# Patient Record
Sex: FEMALE | Race: WHITE | Employment: UNEMPLOYED | URBAN - METROPOLITAN AREA
[De-identification: names, ages, dates, MRNs, and addresses within clinical notes are randomized per-mention and may not be internally consistent; named-entity substitution may affect disease eponyms.]

---

## 2021-08-30 ENCOUNTER — OFFICE VISIT (OUTPATIENT)
Dept: ORTHOPEDIC SURGERY | Age: 51
End: 2021-08-30
Payer: COMMERCIAL

## 2021-08-30 VITALS
BODY MASS INDEX: 32.43 KG/M2 | HEART RATE: 96 BPM | HEIGHT: 63 IN | TEMPERATURE: 96.9 F | OXYGEN SATURATION: 97 % | WEIGHT: 183 LBS

## 2021-08-30 DIAGNOSIS — S83.241A ACUTE MEDIAL MENISCUS TEAR OF RIGHT KNEE, INITIAL ENCOUNTER: Primary | ICD-10-CM

## 2021-08-30 DIAGNOSIS — G57.61 MORTON'S NEUROMA OF RIGHT FOOT: ICD-10-CM

## 2021-08-30 DIAGNOSIS — M79.671 RIGHT FOOT PAIN: ICD-10-CM

## 2021-08-30 DIAGNOSIS — M25.561 RIGHT KNEE PAIN, UNSPECIFIED CHRONICITY: ICD-10-CM

## 2021-08-30 PROCEDURE — 73560 X-RAY EXAM OF KNEE 1 OR 2: CPT | Performed by: ORTHOPAEDIC SURGERY

## 2021-08-30 PROCEDURE — 73620 X-RAY EXAM OF FOOT: CPT | Performed by: ORTHOPAEDIC SURGERY

## 2021-08-30 PROCEDURE — 99203 OFFICE O/P NEW LOW 30 MIN: CPT | Performed by: ORTHOPAEDIC SURGERY

## 2021-08-30 RX ORDER — CELECOXIB 200 MG/1
200 CAPSULE ORAL 2 TIMES DAILY
Qty: 60 CAPSULE | Refills: 1 | Status: SHIPPED | OUTPATIENT
Start: 2021-08-30

## 2021-08-30 NOTE — PROGRESS NOTES
Levi Burnette  1970   Chief Complaint   Patient presents with    Foot Pain     right foot    Knee Pain     right knee        HISTORY OF PRESENT ILLNESS  Levi Burnette is a 46 y.o. female who presents today for evaluation of right kneeShe rates her pain 1/10 today. Pain has been present since July 2021. She was working in the yard and noted a pain later that day. She had to hop and use a cane for a couple days following the onset of pain. Notes that the pain does come and go but describes a sharp sensation accompanied with popping in the knee. Has back and side knee pain. Pt also c/o of right foot pain near the ball of her foot. This pain has been present for around a year. Foot started feeling achy and sore and has been present intermittently. Patient describes the pain as dull that is Intermittent in nature. Symptoms are worse with bending, stretching, straightening, and walking  and is better with  wearing shoes and, Rest. Associated symptoms include Swelling. Since problem started, it: is unchanged. Pain does not wake patient up at night. Has taken NSAIDs for the problem. Has tried following treatments: Injections:NO; Brace:NO;  Therapy:NO; Cane/Crutch:YES      No Known Allergies     Past Medical History:   Diagnosis Date    Anemia     GERD (gastroesophageal reflux disease)     Submucous leiomyoma of uterus       Social History     Socioeconomic History    Marital status:      Spouse name: Not on file    Number of children: Not on file    Years of education: Not on file    Highest education level: Not on file   Occupational History    Not on file   Tobacco Use    Smoking status: Never Smoker    Smokeless tobacco: Never Used   Substance and Sexual Activity    Alcohol use: Never    Drug use: Never    Sexual activity: Yes     Birth control/protection: None   Other Topics Concern    Not on file   Social History Narrative    Not on file     Social Determinants of Health     Financial Resource Strain:     Difficulty of Paying Living Expenses:    Food Insecurity:     Worried About Running Out of Food in the Last Year:     [address] in the Last Year:    Transportation Needs:     Lack of Transportation (Medical):  Lack of Transportation (Non-Medical):    Physical Activity:     Days of Exercise per Week:     Minutes of Exercise per Session:    Stress:     Feeling of Stress :    Social Connections:     Frequency of Communication with Friends and Family:     Frequency of Social Gatherings with Friends and Family:     Attends Judaism Services:     Active Member of Clubs or Organizations:     Attends Club or Organization Meetings:     Marital Status:    Intimate Partner Violence:     Fear of Current or Ex-Partner:     Emotionally Abused:     Physically Abused:     Sexually Abused:       Past Surgical History:   Procedure Laterality Date    HX  SECTION  1988    HX  SECTION      HX GYN      HX WISDOM TEETH EXTRACTION        Family History   Problem Relation Age of Onset    Cancer Mother     Diabetes Father       Current Outpatient Medications   Medication Sig    celecoxib (CeleBREX) 200 mg capsule Take 1 Capsule by mouth two (2) times a day.  biotin 10,000 mcg TbDi biotin 10,000 mcg disintegrating tablet   Take by oral route.  ascorbic acid, vitamin C, (VITAMIN C) 500 mg tablet Vitamin C 500 mg tablet   Take by oral route.  black cohosh 540 mg cap black cohosh 540 mg capsule   Take by oral route.  cholecalciferol (VITAMIN D3) 25 mcg (1,000 unit) cap Take  by mouth daily.  naproxen sodium (ALEVE) 220 mg cap Take  by mouth as needed.  ferrous sulfate 325 mg (65 mg iron) cpER Take 6 Tabs by mouth daily.  zinc 50 mg tab tablet Take  by mouth daily.  Cyanocobalamin-Cobamamide (B12) 5,000-100 mcg lozg by SubLINGual route two (2) times a day.  omeprazole (PRILOSEC OTC) 20 mg tablet Take 20 mg by mouth daily.     loratadine (CLARITIN) 10 mg tablet Take 10 mg by mouth daily. No current facility-administered medications for this visit. REVIEW OF SYSTEM   Patient denies: Weight loss, Fever/Chills, HA, Visual changes, Fatigue, Chest pain, SOB, Abdominal pain, N/V/D/C, Blood in stool or urine, Edema. Pertinent positive as above in HPI. All others were negative    PHYSICAL EXAM:   Visit Vitals  Pulse 96   Temp 96.9 °F (36.1 °C) (Temporal)   Ht 5' 3\" (1.6 m)   Wt 183 lb (83 kg)   SpO2 97%   BMI 32.42 kg/m²     The patient is a well-developed, well-nourished female   in no acute distress. The patient is alert and oriented times three. The patient is alert and oriented times three. Mood and affect are normal.  LYMPHATIC: lymph nodes are not enlarged and are within normal limits  SKIN: normal in color and non tender to palpation. There are no bruises or abrasions noted. NEUROLOGICAL: Motor sensory exam is within normal limits. Reflexes are equal bilaterally.  There is normal sensation to pinprick and light touch  MUSCULOSKELETAL:  Examination Right knee   Skin Intact   Range of motion 0-130   Effusion +   Medial joint line tenderness +   Lateral joint line tenderness -   Tenderness Pes Bursa -   Tenderness insertion MCL -   Tenderness insertion LCL -   Marks +   Patella crepitus +   Patella grind -   Lachman -   Pivot shift -   Anterior drawer -   Posterior drawer -   Varus stress -   Valgus stress -   Neurovascular Intact   Calf Swelling and Tenderness to Palpation -   Annalisa's Test -   Hamstring Cord Tightness -      Examination Right Ankle/Foot   Skin Intact   Swelling -   Dorsiflexion 10   Plantarflexion 25   Deformity -   Inversion laxity -   Anterior drawer -   Medial malleolus tenderness -   Lateral malleolus tenderness -   Heel cord Intact   Heel cord Tightness -   Swain's Test -   Annalisa's Test -   Sensation Intact   Bunion -   Capillary refill Normal       IMAGING: XR of the right knee with 2 views obtained in the office dated 8/30/3021 was reviewed and read by Dr. Elaine Ying: decreased joint space on the medial side    XR of the right foot with 2 views obtained in the office dated 8/30/2021 was reviewed and read by Dr. Elaine Ying: Moderate degenerative changes in the mid-foot      IMPRESSION:      ICD-10-CM ICD-9-CM    1. Acute medial meniscus tear of right knee, initial encounter  S83.241A 836.0 MRI KNEE RT WO CONT      celecoxib (CeleBREX) 200 mg capsule   2. Right knee pain, unspecified chronicity  M25.561 719.46 AMB POC XRAY, KNEE; 1/2 VIEWS   3. Right foot pain  M79.671 729.5 POC XRAY, FOOT; 2 VIEWS   4. Mejia's neuroma of right foot  G57.61 355.6         PLAN:  1. Patient presents today with right knee to foot pain and I would like to order a MRI to r/o a possible meniscus tear of the right knee. Prescribed Celebrex to help with the pain and inflammation. Her symptoms of her right foot is c/w mortan's neuroma. I would like her to follow up with Dr. Jamar Severino for the foot pain, and return in the office following the MRI. Risk factors include: n/a  2. No ultrasound exam indicated today  3. No cortisone injection indicated today   4. No Physical/Occupational Therapy indicated today  5. Yes diagnostic test indicated today: R KNEE MRI  6. No durable medical equipment indicated today  7. No referral indicated today   8. Yes medications indicated today: CELEBREX  9. No Narcotic indicated today    RTC Following MRI       Scribed by Eunice Myers [address]) as dictated by Jade Sheth MD    I, Dr. Jade Sheth, confirm that all documentation is accurate.     Jade Sheth M.D.   [facility]

## 2021-08-31 ENCOUNTER — TELEPHONE (OUTPATIENT)
Dept: ORTHOPEDIC SURGERY | Age: 51
End: 2021-08-31

## 2021-08-31 RX ORDER — MELOXICAM 15 MG/1
15 TABLET ORAL
Qty: 30 TABLET | Refills: 2 | Status: SHIPPED | OUTPATIENT
Start: 2021-08-31

## 2021-08-31 NOTE — TELEPHONE ENCOUNTER
PA is required for Celecoxib 200mg    Cover My Meds Key:  Ankur Charles (Key: [id number])    Donna Houston is processing your PA request and will respond shortly with next steps. You are currently using the fastest method to process this prior authorization. Please do not fax or call [facility] to resubmit this request. To check for an update later, open this request again from your dashboard.

## 2021-09-10 ENCOUNTER — TELEPHONE (OUTPATIENT)
Dept: ORTHOPEDIC SURGERY | Age: 51
End: 2021-09-10

## 2021-09-10 NOTE — TELEPHONE ENCOUNTER
Patient called stating she only has an MRI of her knee scheduled but she says she was told she would also be getting an MRI of her foot. She wants to confirm an MRI of the foot is also needed. She's requesting a call back regarding this at [phone].

## 2021-09-10 NOTE — TELEPHONE ENCOUNTER
Spoke with patient. Patient verbalized understanding that MRI of the knee has been ordered. Patient will see Dr. Vianey Woodard for foot pain, at that time Dr. Vianey Woodard will determine if MRI of the foot is necessary. Patient understood.

## 2021-09-10 NOTE — TELEPHONE ENCOUNTER
Per [facility] she needs to see Dr Flaquita Alexis for her foot who will determine if MRI is needed    She is to also follow up with [facility] following her MRI of her knee.  (apt will be week of 9/20)

## 2021-09-16 ENCOUNTER — HOSPITAL ENCOUNTER (OUTPATIENT)
Age: 51
Discharge: HOME | End: 2021-09-16
Attending: ORTHOPAEDIC SURGERY
Payer: COMMERCIAL

## 2021-09-16 DIAGNOSIS — S83.241A ACUTE MEDIAL MENISCUS TEAR OF RIGHT KNEE, INITIAL ENCOUNTER: ICD-10-CM

## 2021-09-16 PROCEDURE — 73721 MRI JNT OF LWR EXTRE W/O DYE: CPT

## 2021-09-30 ENCOUNTER — OFFICE VISIT (OUTPATIENT)
Dept: ORTHOPEDIC SURGERY | Age: 51
End: 2021-09-30
Payer: COMMERCIAL

## 2021-09-30 VITALS
HEIGHT: 63 IN | WEIGHT: 189 LBS | HEART RATE: 87 BPM | OXYGEN SATURATION: 97 % | BODY MASS INDEX: 33.49 KG/M2 | TEMPERATURE: 96.9 F

## 2021-09-30 DIAGNOSIS — S83.241A TEAR OF MEDIAL MENISCUS OF RIGHT KNEE, CURRENT, UNSPECIFIED TEAR TYPE, INITIAL ENCOUNTER: Primary | ICD-10-CM

## 2021-09-30 PROCEDURE — 99214 OFFICE O/P EST MOD 30 MIN: CPT | Performed by: ORTHOPAEDIC SURGERY

## 2021-10-04 NOTE — PROGRESS NOTES
AMBULATORY PROGRESS NOTE      Patient: Mu Santa             MRN: [id number]     SSN: [id number] Body mass index is 33.37 kg/m². YOB: 1970     AGE: 46 y.o. EX: female    PCP: Deedee Scott MD       IMPRESSION //  DIAGNOSIS AND TREATMENT PLAN        Mu Santa has a diagnosis of:        Explained her that she has metatarsalgia, and more than likely interdigital webspace, neuritis: The conditions for custom trilaminar,  with a metatarsal pad to offload and balance her forefoot. If her symptoms do not improve, order MRI of her right forefoot. DIAGNOSES    1. Metatarsalgia of right foot    2. Neuritis of right foot    3. Right foot pain        Orders Placed This Encounter    Generic Supply Order     R Custom Trilaminar Orthotic w/ goal to offload forefoot    Diagnosis: forefoot neuritic pain        PLAN:    1. DME: R Custom Trilaminar Orthotic: goal to offload forefoot. 2.  If her symptoms, do not improve, MRI of the right forefoot will be obtained, assess the individual species. RTO-5 weeks follow-up    Mu Santa  expresses understanding of the diagnosis, treatment plan, and all of their proposed questions were answered to their satisfaction. Patient education has been provided re the diagnoses. HPI // 67 [address] Sissy Bland IS A 46 y.o. female who is a/an  new patient, presenting to my outpatient office for evaluation of  the following chief complaint(s):     Chief Complaint   Patient presents with    Foot Pain     right, when bare foot has pain in the balls of feet and numbness in toes, feels like something is in foot       Patient presents today w/: She is mentioned having pain discomfort, to the right foot, since July 2020. Her right forefoot pain, started after states she was doing some yard work.   She reports having pain, feels like he is walking on a marble, and occasionally she has numbness tingling and burning in her second webspace is where she points to. At times it feels like she is walking with wet socks, when she has the onset of her neuritic pain in the right forefoot. She is a history of left clubfoot surgery, as an infant, her left foot hindfoot forefoot, and ankle, are not problematic for her, at this current time. She reports having some numbness to right forefoot, when she stands too long pain when she goes barefoot, and he feels like he is walking on a marble, the plantar portion right forefoot    Visit Vitals  Pulse (!) 102   Temp 97.3 °F (36.3 °C) (Skin)   Ht 5' 3\" (1.6 m)   Wt 188 lb 6.4 oz (85.5 kg)   LMP 08/16/2021 Comment: possible menopause   SpO2 95%   BMI 33.37 kg/m²       Appearance: Alert, well appearing and pleasant patient who is in no distress, oriented to person, place/time, and who follows commands. This patient is accompanied in the examination room by her  self. There is signs of: no dementia  Psychiatric: Affect/mood are appropriate. Speech normal in context and clarity, memory intact grossly, no involuntary movements - tremors. Patient arrives to office via: without assistive device:    H EENT (2): Head normocephalic & atraumatic. Eye: pupils are round// EOM are intact // Neck: ROM WNL  // Hearings Intact   Respiratory: Breathing non labored     ANKLE/FOOT right    Gait: slow  Tenderness: mild is present to the right second, third metatarsals at the metatarsal head regions. She is mild tenderness the second interdigital webspace, and to a lesser extent third interdigital webspace. When I palpate each of these metatarsal web spaces, I cannot elicit, any neurogenic symptoms or neuritic symptoms, at this current time. Cutaneous:   WNL. Joint Motion:   WNL  Joint / Tendon Stability: No Ankle or Subtalar instability or joint laxity.                        No peroneal sublux ability or dislocation  Alignment: neutral Hindfoot,    Neuro Motor/Sensory: NL/NL  Vascular: NL foot/ankle pulses,   Lymphatics: No extremity lymphedema, No calf swelling, no tenderness to calf muscles. CHART REVIEW     Deandre Colvin has been experiencing pain and discomfort confirmed as outlined in the pain assessment outlined below.  was reviewed by Jayesh Finch MD on 10/12/2021. Pain Assessment  10/12/2021   Location of Pain Foot   Location Modifiers Right   Severity of Pain 3   Quality of Pain Other (Comment); Bettylou Gaster; Throbbing;Dull;Aching   Quality of Pain Comment numbness   Duration of Pain Persistent   Frequency of Pain Constant   Date Pain First Started -   Date Pain First Started Comment -   Aggravating Factors Other (Comment)   Aggravating Factors Comment daily activity   Limiting Behavior Yes   Relieving Factors Nothing   Relieving Factors Comment -   Result of Injury -        Deandre Colvin  has a past medical history of Anemia, GERD (gastroesophageal reflux disease), Right foot pain, and Submucous leiomyoma of uterus. Patients is employed at:         Past Medical History:   Diagnosis Date    Anemia     GERD (gastroesophageal reflux disease)     Right foot pain     Submucous leiomyoma of uterus      Past Surgical History:   Procedure Laterality Date    HX  SECTION      HX  SECTION      HX GYN      HX WISDOM TEETH EXTRACTION       Current Outpatient Medications   Medication Sig    meloxicam (Mobic) 15 mg tablet Take 1 Tablet by mouth daily (with breakfast).  celecoxib (CeleBREX) 200 mg capsule Take 1 Capsule by mouth two (2) times a day.  biotin 10,000 mcg TbDi biotin 10,000 mcg disintegrating tablet   Take by oral route.  ascorbic acid, vitamin C, (VITAMIN C) 500 mg tablet Vitamin C 500 mg tablet   Take by oral route.  black cohosh 540 mg cap black cohosh 540 mg capsule   Take by oral route.  cholecalciferol (VITAMIN D3) 25 mcg (1,000 unit) cap Take  by mouth daily.     naproxen sodium (ALEVE) 220 mg cap Take  by mouth as needed.  ferrous sulfate 325 mg (65 mg iron) cpER Take 6 Tabs by mouth daily.  zinc 50 mg tab tablet Take  by mouth daily.  Cyanocobalamin-Cobamamide (B12) 5,000-100 mcg lozg by SubLINGual route two (2) times a day.  omeprazole (PRILOSEC OTC) 20 mg tablet Take 20 mg by mouth daily.  loratadine (CLARITIN) 10 mg tablet Take 10 mg by mouth daily. No current facility-administered medications for this visit. No Known Allergies  Social History     Occupational History    Not on file   Tobacco Use    Smoking status: Never Smoker    Smokeless tobacco: Never Used   Substance and Sexual Activity    Alcohol use: Never    Drug use: Never    Sexual activity: Yes     Birth control/protection: None     Family History   Problem Relation Age of Onset    Cancer Mother     Diabetes Father         DIAGNOSTIC LAB DATA      Lab Results   Component Value Date/Time    Hemoglobin A1c 5.1 07/22/2021 09:24 AM    Hemoglobin A1c 5.1 07/08/2020 12:00 PM    Hemoglobin A1c 4.6 06/26/2019 05:11 PM    //   Lab Results   Component Value Date/Time    Glucose 71 (L) 07/22/2021 09:24 AM        No results found for: [id number], [address] [facility]      Lab Results   Component Value Date/Time    Vitamin D, 25-OH, Total 52.1 07/22/2021 09:24 AM         REVIEW OF SYSTEMS : 10/12/2021  ALL BELOW ARE Negative except : SEE HPI     All other systems reviewed and are negative. 12 point review of systems otherwise negative unless noted in HPI. DIAGNOSTIC IMAGING /ORDERS       Orders Placed This Encounter    Generic Supply Order     R Custom Trilaminar Orthotic w/ goal to offload forefoot    Diagnosis: forefoot neuritic pain        X-ray, reviewed, 3 view right foot: From 8/30/2021:  AP lateral and oblique x-rays       These films, show degenerative osteoarthritis, quite severe, to the right first TMT,.   The first metatarsal, is significant shorter than the cascade of the second third and fourth of the metatarsal regions when looking at the forefoot and when drawing a parabola type of line or parabolic line. I see no evidence of stress fracture, to the lesser metatarsals or lesser digits of the toes. I have reviewed the results of the above study. The interpretation of this study is my professional opinion. On this date 10/12/2021 I have spent 35 minutes reviewing previous notes, test results and face to face with the patient discussing the diagnosis and importance of compliance with the treatment plan as well as documenting on the day of the visit. An electronic signature was used to authenticate this note. Disclaimer: Sections of this note are dictated using utilizing voice recognition software, which may have resulted in some phonetic based errors in grammar and contents. Even though attempts were made to correct all the mistakes, some may have been missed, and remained in the body of the document. If questions arise, please contact our department. William Parish may have a reminder for a \"due or due soon\" health maintenance. I have asked that she contact her primary care provider for follow-up on this health maintenance.     Junior Singh, as directed by , Jeanice Seip  10/12/2021  1:09 PM

## 2021-10-12 ENCOUNTER — OFFICE VISIT (OUTPATIENT)
Dept: ORTHOPEDIC SURGERY | Age: 51
End: 2021-10-12
Payer: COMMERCIAL

## 2021-10-12 VITALS
OXYGEN SATURATION: 95 % | HEART RATE: 102 BPM | HEIGHT: 63 IN | BODY MASS INDEX: 33.38 KG/M2 | TEMPERATURE: 97.3 F | WEIGHT: 188.4 LBS

## 2021-10-12 DIAGNOSIS — M77.41 METATARSALGIA OF RIGHT FOOT: Primary | ICD-10-CM

## 2021-10-12 DIAGNOSIS — G57.91 NEURITIS OF RIGHT FOOT: ICD-10-CM

## 2021-10-12 DIAGNOSIS — M79.671 RIGHT FOOT PAIN: ICD-10-CM

## 2021-10-12 PROCEDURE — 99214 OFFICE O/P EST MOD 30 MIN: CPT | Performed by: ORTHOPAEDIC SURGERY

## 2021-10-12 NOTE — PATIENT INSTRUCTIONS
Metatarsalgia: Care Instructions  Your Care Instructions     Metatarsalgia (say \"met-uh-tar-SAL-ailyn-uh\") is pain in the ball of the foot. It sometimes spreads to the toes. The ball of the foot is the bottom of the foot, where the toes join the foot. While walking might be very painful, the pain is usually not a sign of a serious or permanent problem. But any pain can affect your life, so it is important that you treat it. Pain in this area can be caused by many things. For example, you may have this pain if you stand or walk a lot or wear tight shoes or high heels. Your pain might be caused by inflammation of a joint (capsulitis). It is most common in the joint at the base of the second toe, near the ball of the foot. Capsulitis happens when ligaments that go around the joint become inflamed. The joint may be swollen. It may feel like there is a small rock under it. You may have had an X-ray if your doctor wanted to make sure a more serious problem is not causing your pain. Treatment may consist of home care, such as rest, wearing different shoes, and taking over-the-counter pain medicines. It can take months for the pain to go away. If the ligaments around a joint are torn, or if a toe has started to slant toward the toe next to it, you may need surgery. Follow-up care is a key part of your treatment and safety. Be sure to make and go to all appointments, and call your doctor if you are having problems. It's also a good idea to know your test results and keep a list of the medicines you take. How can you care for yourself at home? · Rest your foot. If an activity is causing the pain, find another one to do that does not put so much pressure on your foot. · Put ice or a cold pack on your foot when it hurts or after you've done something that usually causes pain. Do this for 10 to 20 minutes at a time. Put a thin cloth between the ice and your skin.   · Take an over-the-counter pain medicine, such as acetaminophen (Tylenol), ibuprofen (Advil, Motrin), or naproxen (Aleve). Be safe with medicines. Read and follow all instructions on the label. · Do not take two or more pain medicines at the same time unless the doctor told you to. Many pain medicines have acetaminophen, which is Tylenol. Too much acetaminophen (Tylenol) can be harmful. · Wear roomy, comfortable shoes. · If your doctor recommends it, use special pads to relieve the pressure on your foot. The pads may fit into your shoes, or they may stick to the soles of your feet. · Ask your doctor about using orthotic shoe devices. These are molded pieces of rubber, leather, metal, plastic, or other synthetic material that are inserted into a shoe. · Wear shoes with good arch support. · Try not to wear high heels or narrow shoes. · Follow your doctor's or physical therapist's directions for exercise. When should you call for help? Watch closely for changes in your health, and be sure to contact your doctor if:    · You have new or worse symptoms.     · You do not get better as expected. Where can you learn more? Go to http://www.gray.com/  Enter R284 in the search box to learn more about \"Metatarsalgia: Care Instructions. \"  Current as of: July 1, 2021               Content Version: 13.0  © 2312-5911 [employer]. Care instructions adapted under license by [employer] (which disclaims liability or warranty for this information). If you have questions about a medical condition or this instruction, always ask your healthcare professional. [place] [address] any warranty or liability for your use of this information. Mejia's Neuroma: Care Instructions  Your Care Instructions  When your toes are squeezed together, often over a period of months or even years, the nerve that runs between the toes can swell and get thicker. This is called a Mejia's neuroma.  It may feel like a small lump is pushing inside the ball of your foot. When you walk or move your toes, you feel pain that sometimes moves into your toes. If the pressure continues, it may damage the nerve. If you catch the problem early and change your shoes, the nerve swelling may go away. Your doctor may advise you to wear wide-toed shoes. He or she also may suggest that you ice the sore spot and limit activities that put pressure on the nerve. If these steps do not help your symptoms, your doctor may have you use special pads or devices that spread the toes. This keeps them from squeezing the nerve. In some cases, you may get a cortisone shot to reduce swelling and pain. If these treatments don't help, your doctor may suggest surgery to relieve pressure or remove the swollen nerve. Follow-up care is a key part of your treatment and safety. Be sure to make and go to all appointments, and call your doctor if you are having problems. It's also a good idea to know your test results and keep a list of the medicines you take. How can you care for yourself at home? · Ask your doctor if you can take an over-the-counter pain medicine, such as acetaminophen (Tylenol), ibuprofen (Advil, Motrin), or naproxen (Aleve). Be safe with medicines. Read and follow all instructions on the label. · Try to stay off your feet as much as possible until the pain and swelling go away. · Avoid wearing tight, pointy, or high-heeled shoes. Instead, wear roomy footwear. · Put ice or a cold pack on the area for 10 to 20 minutes at a time. Put a thin cloth between the ice and your skin. · Try massaging your feet. This relaxes the muscles around the nerve. · If your doctor prescribed special pads or a device to relieve pressure on your toes, use these items as directed. · Until all pain and swelling go away, avoid activities that put pressure on the toes. These include racquet sports and running. When should you call for help?   Watch closely for changes in your health, and be sure to contact your doctor if:    · You do not get better as expected. Where can you learn more? Go to http://www.gray.com/  Enter E100 in the search box to learn more about \"Mejia's Neuroma: Care Instructions. \"  Current as of: July 1, 2021               Content Version: 13.0  © 9621-0445 [employer]. Care instructions adapted under license by [employer] (which disclaims liability or warranty for this information). If you have questions about a medical condition or this instruction, always ask your healthcare professional. [place] [address] any warranty or liability for your use of this information.

## 2021-10-25 DIAGNOSIS — Z01.818 PREOP EXAMINATION: Primary | ICD-10-CM

## 2021-11-12 DIAGNOSIS — Z01.818 PREOP EXAMINATION: Primary | ICD-10-CM

## 2021-11-15 ENCOUNTER — HOSPITAL ENCOUNTER (OUTPATIENT)
Dept: LAB | Age: 51
Discharge: HOME | End: 2021-11-15
Payer: COMMERCIAL

## 2021-11-15 DIAGNOSIS — Z01.818 PREOP EXAMINATION: ICD-10-CM

## 2021-11-15 LAB
ANION GAP SERPL CALC-SCNC: 6 MMOL/L (ref 3–18)
ATRIAL RATE: 75 BPM
BASOPHILS # BLD: 0.1 K/UL (ref 0–0.1)
BASOPHILS NFR BLD: 1 % (ref 0–2)
BUN SERPL-MCNC: 22 MG/DL (ref 7–18)
BUN/CREAT SERPL: 26 (ref 12–20)
CALCIUM SERPL-MCNC: 8.9 MG/DL (ref 8.5–10.1)
CALCULATED P AXIS, ECG09: 59 DEGREES
CALCULATED R AXIS, ECG10: 3 DEGREES
CALCULATED T AXIS, ECG11: 51 DEGREES
CHLORIDE SERPL-SCNC: 110 MMOL/L (ref 100–111)
CO2 SERPL-SCNC: 25 MMOL/L (ref 21–32)
CREAT SERPL-MCNC: 0.84 MG/DL (ref 0.6–1.3)
DIAGNOSIS, 93000: NORMAL
DIFFERENTIAL METHOD BLD: NORMAL
EOSINOPHIL # BLD: 0.2 K/UL (ref 0–0.4)
EOSINOPHIL NFR BLD: 3 % (ref 0–5)
ERYTHROCYTE [DISTWIDTH] IN BLOOD BY AUTOMATED COUNT: 12.8 % (ref 11.6–14.5)
GLUCOSE SERPL-MCNC: 101 MG/DL (ref 74–99)
HCT VFR BLD AUTO: 39.8 % (ref 35–45)
HGB BLD-MCNC: 13.7 G/DL (ref 12–16)
IMM GRANULOCYTES # BLD AUTO: 0 K/UL (ref 0–0.04)
IMM GRANULOCYTES NFR BLD AUTO: 0 % (ref 0–0.5)
LYMPHOCYTES # BLD: 1.7 K/UL (ref 0.9–3.6)
LYMPHOCYTES NFR BLD: 21 % (ref 21–52)
MCH RBC QN AUTO: 30.2 PG (ref 24–34)
MCHC RBC AUTO-ENTMCNC: 34.4 G/DL (ref 31–37)
MCV RBC AUTO: 87.9 FL (ref 78–100)
MONOCYTES # BLD: 0.4 K/UL (ref 0.05–1.2)
MONOCYTES NFR BLD: 5 % (ref 3–10)
NEUTS SEG # BLD: 5.8 K/UL (ref 1.8–8)
NEUTS SEG NFR BLD: 71 % (ref 40–73)
NRBC # BLD: 0 K/UL (ref 0–0.01)
NRBC BLD-RTO: 0 PER 100 WBC
P-R INTERVAL, ECG05: 150 MS
PLATELET # BLD AUTO: 188 K/UL (ref 135–420)
PMV BLD AUTO: 10.5 FL (ref 9.2–11.8)
POTASSIUM SERPL-SCNC: 4.2 MMOL/L (ref 3.5–5.5)
Q-T INTERVAL, ECG07: 384 MS
QRS DURATION, ECG06: 86 MS
QTC CALCULATION (BEZET), ECG08: 428 MS
RBC # BLD AUTO: 4.53 M/UL (ref 4.2–5.3)
SODIUM SERPL-SCNC: 141 MMOL/L (ref 136–145)
VENTRICULAR RATE, ECG03: 75 BPM
WBC # BLD AUTO: 8.2 K/UL (ref 4.6–13.2)

## 2021-11-15 PROCEDURE — 93005 ELECTROCARDIOGRAM TRACING: CPT

## 2021-11-15 PROCEDURE — 36415 COLL VENOUS BLD VENIPUNCTURE: CPT

## 2021-11-15 PROCEDURE — 80048 BASIC METABOLIC PNL TOTAL CA: CPT

## 2021-11-15 PROCEDURE — 85025 COMPLETE CBC W/AUTO DIFF WBC: CPT

## 2021-11-23 DIAGNOSIS — S83.241A TEAR OF MEDIAL MENISCUS OF RIGHT KNEE, CURRENT, UNSPECIFIED TEAR TYPE, INITIAL ENCOUNTER: Primary | ICD-10-CM

## 2021-11-23 RX ORDER — HYDROCODONE BITARTRATE AND ACETAMINOPHEN 7.5; 325 MG/1; MG/1
1 TABLET ORAL
Qty: 40 TABLET | Refills: 0 | Status: SHIPPED | OUTPATIENT
Start: 2021-11-29 | End: 2021-12-06

## 2021-11-24 ENCOUNTER — OFFICE VISIT (OUTPATIENT)
Dept: ORTHOPEDIC SURGERY | Age: 51
End: 2021-11-24

## 2021-11-24 VITALS
DIASTOLIC BLOOD PRESSURE: 93 MMHG | BODY MASS INDEX: 33.73 KG/M2 | WEIGHT: 190.4 LBS | TEMPERATURE: 97.8 F | SYSTOLIC BLOOD PRESSURE: 133 MMHG | OXYGEN SATURATION: 97 % | HEIGHT: 63 IN | HEART RATE: 73 BPM

## 2021-11-24 DIAGNOSIS — G89.18 ACUTE POSTOPERATIVE PAIN: Primary | ICD-10-CM

## 2021-11-24 RX ORDER — OXYCODONE AND ACETAMINOPHEN 5; 325 MG/1; MG/1
1 TABLET ORAL
Qty: 28 TABLET | Refills: 0 | Status: SHIPPED | OUTPATIENT
Start: 2021-11-24 | End: 2021-12-01

## 2021-11-24 RX ORDER — CELECOXIB 100 MG/1
200 CAPSULE ORAL
Status: CANCELLED | OUTPATIENT
Start: 2021-11-29 | End: 2021-11-30

## 2021-11-24 RX ORDER — ACETAMINOPHEN 325 MG/1
500 TABLET ORAL
Status: CANCELLED | OUTPATIENT
Start: 2021-11-29 | End: 2021-11-30

## 2021-11-24 RX ORDER — PREGABALIN 25 MG/1
75 CAPSULE ORAL
Status: CANCELLED | OUTPATIENT
Start: 2021-11-29 | End: 2021-11-30

## 2021-11-24 NOTE — H&P
Patient: Ana Vázquez                MRN: [id number]       SSN: [id number]  YOB: 1970        AGE: 46 y.o. SEX: female          PCP: Rosalba Haro MD  21    Chief Complaint   Patient presents with    Knee Pain     Right       HISTORY:  Ana Vázquez is a 46 y.o. female seen for history and physical in preparation for right knee arthroscopy with probable medial meniscectomy.       Pain Assessment  2021   Location of Pain Knee   Location Modifiers Right   Severity of Pain 2   Quality of Pain Dull;Aching   Quality of Pain Comment -   Duration of Pain Persistent   Frequency of Pain Constant   Date Pain First Started -   Date Pain First Started Comment -   Aggravating Factors Stretching   Aggravating Factors Comment -   Limiting Behavior Yes   Relieving Factors Other (Comment)   Relieving Factors Comment Icy hot and braces   Result of Injury Yes   Work-Related Injury No           Lab Results   Component Value Date/Time    Hemoglobin A1c 5.1 2021 09:24 AM     Weight Metrics 2021 10/12/2021 2021 2021 2020 11/15/2019 3/3/2017   Weight 190 lb 6.4 oz 188 lb 6.4 oz 189 lb 183 lb 192 lb 7.4 oz 198 lb 6.6 oz 190 lb   BMI 33.73 kg/m2 33.37 kg/m2 33.48 kg/m2 32.42 kg/m2 34.09 kg/m2 35.15 kg/m2 33.66 kg/m2            Problem List Items Addressed This Visit     None      Visit Diagnoses     Acute postoperative pain    -  Primary    Relevant Medications    oxyCODONE-acetaminophen (PERCOCET) 5-325 mg per tablet          PAST MEDICAL HISTORY:   Past Medical History:   Diagnosis Date    Anemia     GERD (gastroesophageal reflux disease)     Right foot pain     Submucous leiomyoma of uterus        PAST SURGICAL HISTORY:   Past Surgical History:   Procedure Laterality Date    HX  SECTION      HX  SECTION      HX GYN      HX WISDOM TEETH EXTRACTION         ALLERGIES: No Known Allergies CURRENT MEDICATIONS:  A list of medications prior to the time of admission include:  Prior to Admission medications    Medication Sig Start Date End Date Taking? Authorizing Provider   oxyCODONE-acetaminophen (PERCOCET) 5-325 mg per tablet Take 1 Tablet by mouth every four (4) hours as needed for Pain for up to 7 days. Max Daily Amount: 6 Tablets. 11/24/21 12/1/21 Yes Luigi Joseph PA-C   HYDROcodone-acetaminophen (Norco) 7.5-325 mg per tablet Take 1 Tablet by mouth every four (4) hours as needed for Pain for up to 7 days. Max Daily Amount: 6 Tablets. 11/29/21 12/6/21 Yes Maury Metcalf MD   meloxicam (Mobic) 15 mg tablet Take 1 Tablet by mouth daily (with breakfast). 8/31/21  Yes Julisa Johnson PA   celecoxib (CeleBREX) 200 mg capsule Take 1 Capsule by mouth two (2) times a day. 8/30/21  Yes Maury Metcalf MD   biotin 10,000 mcg TbDi biotin 10,000 mcg disintegrating tablet   Take by oral route. Yes Provider, Historical   ascorbic acid, vitamin C, (VITAMIN C) 500 mg tablet Vitamin C 500 mg tablet   Take by oral route. Yes Provider, Historical   black cohosh 540 mg cap black cohosh 540 mg capsule   Take by oral route. Yes Provider, Historical   cholecalciferol (VITAMIN D3) 25 mcg (1,000 unit) cap Take  by mouth daily. Yes Provider, Historical   naproxen sodium (ALEVE) 220 mg cap Take  by mouth as needed. Yes Provider, Historical   ferrous sulfate 325 mg (65 mg iron) cpER Take 6 Tabs by mouth daily. Yes Provider, Historical   zinc 50 mg tab tablet Take  by mouth daily. Yes Provider, Historical   Cyanocobalamin-Cobamamide (B12) 5,000-100 mcg lozg by SubLINGual route two (2) times a day. Yes Provider, Historical   omeprazole (PRILOSEC OTC) 20 mg tablet Take 20 mg by mouth daily. Yes Provider, Historical   loratadine (CLARITIN) 10 mg tablet Take 10 mg by mouth daily.    Yes Provider, Historical       FAMILY HISTORY:   Family History   Problem Relation Age of Onset    Cancer Mother     Diabetes Father        SOCIAL HISTORY:   Social History     Socioeconomic History    Marital status:    Tobacco Use    Smoking status: Never Smoker    Smokeless tobacco: Never Used   Substance and Sexual Activity    Alcohol use: Never    Drug use: Never    Sexual activity: Yes     Birth control/protection: None       ROS:No CP, No SOB, No fever/chills nor night sweats. No headaches, vision abnormalities to include double and oral loss of vision. No hearing abnormalities. Musculoskeletal pain per HPI. Pain is exacerbated positionally. Pt denies h/o spinal surgery, injections, or PT/chiropractor. Self treated with less than adequate relief on oral antiinflammatories. . Pt denies change in bowel or bladder habits. Pt denies fever, weight loss, or skin changes. PHYSICAL EXAM:    Visit Vitals  BP (!) 133/93 (BP 1 Location: Left upper arm, BP Patient Position: Sitting, BP Cuff Size: Adult)   Pulse 73   Temp 97.8 °F (36.6 °C) (Temporal)   Ht 5' 3\" (1.6 m)   Wt 190 lb 6.4 oz (86.4 kg)   SpO2 97%   BMI 33.73 kg/m²       Constitutional: Appears well-developed and well-nourished. No distress. Sitting comfortably in the exam room, interacting with conversation with pleasant affect. Gait is steady and patient exhibits no evidence of ataxia. Patient is able to ambulate without difficulty. No focal neurological deficit noted. No facial droop, slurred speech, or evidence of altered mentation noted on exam.   Skin: Skin over the head, neck, bilateral limbs, and trunk is warm and dry. No rash or erythema noted. Cranial Nerves II-XII grossly intact  HENT: NC/AT. Normal symmetry, bulk and tone of facial and neck musculature. Trachea midline. No discernible thyromegaly or masses. No involuntary movements. Lymphatic: No preauricular, submandibuar, anterior or posterior cervical lymphadenopathy. Psychiatric: The patient is awake, alert, and oriented to person, place and time.  Behavior is normal. Thought content normal.   Cardiovascular: No clubbing, cyanosis. No edema bilateral lower extremities. Pulmonary: No tripoding nor accessory muscle recruitment. Breathing normally, no distress, no audible wheezing. Distal cap refill intact at 2/2 Chan UE / LE. Neuro intact Chan UE/LE to noxious stimuli        Ortho Specific exam:    Right knee reveals a trace effusion. Active range of motion 105 degrees -10 degrees. Pain and guarding throughout. No instability on medial lateral stressing. No calf tenderness or evidence of DVT right lower extremity. IMPRESSION:      ICD-10-CM ICD-9-CM    1. Acute postoperative pain  G89.18 338.18 oxyCODONE-acetaminophen (PERCOCET) 5-325 mg per tablet   2. Right knee internal derangement per MRI recommend arthroscopy with partial medial meniscectomy. PLAN: Per Dr. Pat Bee right knee arthroscopy with partial probable medial meniscectomy. Risk / Benefit: Surgeon will discuss in \"face to face\" encounter on day of surgery. Family History and Surgical History reviewed, discussed in detail, and deemed Non-Contributory in preparation for this surgical encounter. Additionally today we discussed the diagnosis of obesity and the importance of weight management for both cardiovascular health. The patient was recommended to decrease carbohydrate and sugar intake. Patient recommended a formal dietary consult which they will consider and return a call to our office. In light of the patient's osteoarthritic findings I am making a recommendation for aerobic exercise to include but not limited to stationary bicycle, elliptical, therapeutic walking with good shoes and or swimming. Patient should avoid any running or jumping. If using the treadmill then recommendation for no elevation and no running or jogging. No Narcotic indicated today. Patient given pain medication for short term acute pain relief.  Goal is to treat patient according to above plan and to ultimately have patient off all pain medications once appropriate. If chronic pain management is required beyond what is expected for current orthopedic problem, will refer patient to pain management.  was reviewed and will be reviewed with every medication refill request.         Patient provided a reminder for a \"due or due soon\" health maintenance. I have asked the patient to schedule an appointment with their primary care provider for follow-up on general health maintenance concerns. Today all the patient's questions were answered to their satisfaction. Copies of x-rays reviewed if obtained this visit, and provided to patient. Dictation disclaimer:  Please note that this dictation was completed with [employer], the computer voice recognition software. Quite often unanticipated grammatical, syntax, homophones, and other interpretive errors are inadvertently transcribed by the computer software. Please disregard these errors. Please excuse any errors that have escaped final proofreading. Sly KENNEDY, APC, MPAS, PA-C  [facility]

## 2021-12-06 ENCOUNTER — OFFICE VISIT (OUTPATIENT)
Dept: ORTHOPEDIC SURGERY | Age: 51
End: 2021-12-06
Payer: COMMERCIAL

## 2021-12-06 VITALS — TEMPERATURE: 97 F

## 2021-12-06 DIAGNOSIS — Z98.890 STATUS POST ARTHROSCOPY OF RIGHT KNEE: Primary | ICD-10-CM

## 2021-12-06 PROCEDURE — 99024 POSTOP FOLLOW-UP VISIT: CPT | Performed by: PHYSICIAN ASSISTANT

## 2021-12-06 NOTE — PROGRESS NOTES
Alfredo Contreras returns the office status post a right knee arthroscopy. Doing well today. Her surgical sites have matured nicely. They were closed with a chemical agent similar to Dermabond. Patient's otherwise range of motion is lacking 5 degrees to full but she can easily flex to 110 degrees. There is no pain or guarding through either plane of motion. Surgical sites over the anterior medial and lateral knee intact wound borders well approximated remnant chemical closure agent noted over the medial proximal portion with no evidence of surrounding erythema or skin infection at either site. Op report unavailable for review or comment at this time. Today we discussed options for care to include but not limited to a short course of physical therapy to improve range of motion. Patient declined. She will follow with Dr. Troy Canas in 2 weeks for discussion concerning intraoperative findings. Today all of her questions answered to her satisfaction.

## 2021-12-20 ENCOUNTER — OFFICE VISIT (OUTPATIENT)
Dept: ORTHOPEDIC SURGERY | Age: 51
End: 2021-12-20
Payer: COMMERCIAL

## 2021-12-20 VITALS
WEIGHT: 186 LBS | HEIGHT: 64 IN | BODY MASS INDEX: 31.76 KG/M2 | OXYGEN SATURATION: 98 % | TEMPERATURE: 98.6 F | HEART RATE: 73 BPM

## 2021-12-20 DIAGNOSIS — Z98.890 STATUS POST ARTHROSCOPY OF RIGHT KNEE: ICD-10-CM

## 2021-12-20 DIAGNOSIS — M79.89 MASS OF SOFT TISSUE OF KNEE: Primary | ICD-10-CM

## 2021-12-20 PROCEDURE — 99214 OFFICE O/P EST MOD 30 MIN: CPT | Performed by: ORTHOPAEDIC SURGERY

## 2021-12-20 NOTE — PROGRESS NOTES
Rome Cortes  1970   Chief Complaint   Patient presents with    Knee Pain     right knee        HISTORY OF PRESENT ILLNESS  Rome Cortes is a 46 y.o. female who presents today for reevaluation of right knee. Patient rates pain as 8/10 today. She is s/p partial medial meniscectomy on 11/15/2021. She still notes pain with certain movements and aching with climbing stairs. Has not been to PT. She notes a mass on the left knee with associated pain that has been present for a couple of weeks. Patient denies any fever, chills, chest pain, shortness of breath or calf pain. The remainder of the review of systems is negative. There are no new illness or injuries to report since last seen in the office. There are no changes to medications, allergies, family or social history. PHYSICAL EXAM:   Visit Vitals  Pulse 73   Temp 98.6 °F (37 °C) (Temporal)   Ht 5' 3.5\" (1.613 m)   Wt 186 lb (84.4 kg)   SpO2 98%   BMI 32.43 kg/m²     The patient is a well-developed, well-nourished female   in no acute distress. The patient is alert and oriented times three. The patient is alert and oriented times three. Mood and affect are normal.  LYMPHATIC: lymph nodes are not enlarged and are within normal limits  SKIN: normal in color and non tender to palpation. There are no bruises or abrasions noted. NEUROLOGICAL: Motor sensory exam is within normal limits. Reflexes are equal bilaterally.  There is normal sensation to pinprick and light touch  MUSCULOSKELETAL:  Examination Left knee   Skin Intact   Range of motion 0-130   Effusion +   Medial joint line tenderness -   Lateral joint line tenderness +   Tenderness Pes Bursa -   Tenderness insertion MCL -   Tenderness insertion LCL -   Marks -   Patella crepitus -   Patella grind -   Lachman -   Pivot shift -   Anterior drawer -   Posterior drawer -   Varus stress -   Valgus stress -   Neurovascular Intact   Calf Swelling and Tenderness to Palpation - Annalisa's Test -   Hamstring Cord Tightness -   Soft tissue mass lateral to proximal fibular head  ORTHOPEDIC EXAM of Right knee: Inspection: Effusion present,  incisions clean, dry intact, sutures in place  TTP: diffusely  Range of motion: 0-110 flexion  Stability: Stable  Strength: 5/5  2+ distal pulses      IMPRESSION:      ICD-10-CM ICD-9-CM    1. Mass of soft tissue of knee  M79.89 729.99    2. Status post arthroscopy of right knee  Z98.890 V45.89         PLAN:   1. Patient presents today s/p right knee partial medial meniscectomy on 11/15/2021. She has pain today but has improved since surgery. I am hopeful she will continue to improve. For the left knee mass, I would like to order a MRI. Return following MRI. Risk factors include: BMI>30  2. No ultrasound exam indicated today  3. No cortisone injection indicated today   4. No Physical/Occupational Therapy indicated today  5. Yes diagnostic test indicated today: L KNEE MRI   6. No durable medical equipment indicated today  7. No referral indicated today   8. No medications indicated today:   9. No Narcotic indicated today     RTC Following MRI       Scribed by Pilo Elder [facility]) as dictated by Ronaldo Rogers MD    I, Dr. Ronaldo Rogers, confirm that all documentation is accurate.     Ronaldo Rogers M.D.   [facility]

## 2022-01-31 ENCOUNTER — HOSPITAL ENCOUNTER (OUTPATIENT)
Age: 52
Discharge: HOME | End: 2022-01-31
Attending: ORTHOPAEDIC SURGERY
Payer: COMMERCIAL

## 2022-01-31 DIAGNOSIS — M79.89 MASS OF SOFT TISSUE OF KNEE: ICD-10-CM

## 2022-01-31 PROCEDURE — 73721 MRI JNT OF LWR EXTRE W/O DYE: CPT

## 2022-02-03 ENCOUNTER — OFFICE VISIT (OUTPATIENT)
Dept: ORTHOPEDIC SURGERY | Age: 52
End: 2022-02-03
Payer: COMMERCIAL

## 2022-02-03 VITALS
TEMPERATURE: 98.7 F | WEIGHT: 184.4 LBS | HEIGHT: 63 IN | HEART RATE: 68 BPM | OXYGEN SATURATION: 97 % | BODY MASS INDEX: 32.67 KG/M2

## 2022-02-03 DIAGNOSIS — M79.89 MASS OF SOFT TISSUE OF KNEE: Primary | ICD-10-CM

## 2022-02-03 DIAGNOSIS — Z98.890 STATUS POST ARTHROSCOPY OF RIGHT KNEE: ICD-10-CM

## 2022-02-03 PROCEDURE — 99213 OFFICE O/P EST LOW 20 MIN: CPT | Performed by: ORTHOPAEDIC SURGERY

## 2022-02-03 NOTE — PROGRESS NOTES
Britany Cade  1970   Chief Complaint   Patient presents with    Knee Pain     F/U MRI LT Knee        HISTORY OF PRESENT ILLNESS  Britany Cade is a 46 y.o. female who presents today for reevaluation and MRI review of left knee. Patient rates pain as 0/10 today. She has complaints of a mass on her left knee that is associated with pain. She is s/p right knee partial medial meniscectomy on 11/15/2021. She still notes pain with certain movements and aching with climbing stairs. Patient denies any fever, chills, chest pain, shortness of breath or calf pain. The remainder of the review of systems is negative. There are no new illness or injuries to report since last seen in the office. There are no changes to medications, allergies, family or social history. PHYSICAL EXAM:   Visit Vitals  Pulse 68   Temp 98.7 °F (37.1 °C) (Temporal)   Ht 5' 3\" (1.6 m)   Wt 184 lb 6.4 oz (83.6 kg)   SpO2 97%   BMI 32.66 kg/m²     The patient is a well-developed, well-nourished female   in no acute distress. The patient is alert and oriented times three. The patient is alert and oriented times three. Mood and affect are normal.  LYMPHATIC: lymph nodes are not enlarged and are within normal limits  SKIN: normal in color and non tender to palpation. There are no bruises or abrasions noted. NEUROLOGICAL: Motor sensory exam is within normal limits. Reflexes are equal bilaterally.  There is normal sensation to pinprick and light touch  MUSCULOSKELETAL:  Examination Left knee   Skin Intact   Range of motion 0-130   Effusion +   Medial joint line tenderness -   Lateral joint line tenderness +   Tenderness Pes Bursa -   Tenderness insertion MCL -   Tenderness insertion LCL -   Marks -   Patella crepitus -   Patella grind -   Lachman -   Pivot shift -   Anterior drawer -   Posterior drawer -   Varus stress -   Valgus stress -   Neurovascular Intact   Calf Swelling and Tenderness to Palpation -   Annalisa's Test -   Hamstring Cord Tightness -   Soft tissue mass lateral to proximal fibular head  ORTHOPEDIC EXAM of Right knee: Inspection: normal  TTP: diffusely  Range of motion: 0-110 flexion  Stability: Stable  Strength: 5/5  2+ distal pulses    IMAGING: MRI of the left knee dated 1/31/2022 was reviewed and read by Dr. Dominick Condon:   IMPRESSION  1.  A 1.8 x 1.5 x 2.2 cm ill-defined fat-containing subcutaneous base lesion at the lateral aspect of the knee in the region of concern. Imaging appearance is not typical for simple lipoma although simple lipoma remains possible. Differential considerations include simple lipoma, lipoma variant, hibernoma, and atypical lipomatous tumor. If surgical excision is not performed recommend close clinical and/or short-term imaging follow-up. Consider follow-up MRI without and with IV contrast within 3 months. 2.  Moderate-sized leaking/ruptured Baker's cyst.  3.  Mild prepatellar bursal distention, possible bursitis. 4.  Mild edema at the quadriceps fat pad which could reflect fat pad  impingement. 5.  Minimal degenerative changes as above. IMPRESSION:      ICD-10-CM ICD-9-CM    1. Mass of soft tissue of knee  M79.89 729.99    2. Status post arthroscopy of right knee  Z98.890 V45.89         PLAN:   1. Patient presents today s/p right knee partial medial meniscectomy on 11/15/2021. The right knee is overall doing well. For her left knee, I referred her to interventional radiology to determine the status of the mass and MCV to discuss a mass removal. Return as needed       Risk factors include: BMI>30  2. No ultrasound exam indicated today  3. No cortisone injection indicated today   4. No Physical/Occupational Therapy indicated today  5. No diagnostic test indicated today  6. No durable medical equipment indicated today  7. Yes referral indicated today interventional radiology and MCV   8. No medications indicated today:   9.  No Narcotic indicated today     RTC prn      Scribed by Kit Douglass ([facility]) as dictated by MD OUMOU Mcintosh, Dr. Dhaval Nath, confirm that all documentation is accurate.     Dhaval Nath M.D.   [facility]

## 2022-03-15 ENCOUNTER — TELEPHONE (OUTPATIENT)
Dept: ORTHOPEDIC SURGERY | Age: 52
End: 2022-03-15

## 2022-08-09 ENCOUNTER — OFFICE VISIT (OUTPATIENT)
Dept: ORTHOPEDIC SURGERY | Age: 52
End: 2022-08-09
Payer: COMMERCIAL

## 2022-08-09 VITALS
SYSTOLIC BLOOD PRESSURE: 117 MMHG | BODY MASS INDEX: 31.4 KG/M2 | WEIGHT: 177.2 LBS | DIASTOLIC BLOOD PRESSURE: 84 MMHG | HEIGHT: 63 IN | TEMPERATURE: 96.9 F

## 2022-08-09 DIAGNOSIS — M77.41 METATARSALGIA OF RIGHT FOOT: ICD-10-CM

## 2022-08-09 DIAGNOSIS — M79.671 RIGHT FOOT PAIN: ICD-10-CM

## 2022-08-09 DIAGNOSIS — D36.13 NEUROMA OF FOOT: ICD-10-CM

## 2022-08-09 DIAGNOSIS — Z87.768 HISTORY OF CLUBFOOT: ICD-10-CM

## 2022-08-09 DIAGNOSIS — M19.071 PRIMARY OSTEOARTHRITIS OF RIGHT FOOT: Primary | ICD-10-CM

## 2022-08-09 PROCEDURE — 73630 X-RAY EXAM OF FOOT: CPT | Performed by: ORTHOPAEDIC SURGERY

## 2022-08-09 PROCEDURE — 99214 OFFICE O/P EST MOD 30 MIN: CPT | Performed by: ORTHOPAEDIC SURGERY

## 2022-08-09 RX ORDER — HYDROCODONE BITARTRATE AND ACETAMINOPHEN 5; 325 MG/1; MG/1
TABLET ORAL
COMMUNITY
Start: 2022-07-14

## 2022-08-09 RX ORDER — CYCLOBENZAPRINE HCL 10 MG
10 TABLET ORAL 2 TIMES DAILY
COMMUNITY
Start: 2022-08-03

## 2022-08-09 NOTE — PROGRESS NOTES
AMBULATORY PROGRESS NOTE      Patient: Lety Roth             MRN: [id number]     SSN: [id number] Body mass index is 31.39 kg/m². YOB: 1970     AGE: 45 y.o. EX: female    PCP: Ginny Donnelly MD       IMPRESSION //  DIAGNOSIS AND TREATMENT PLAN      Lety Roth has a diagnosis of:        She clinically has tenderness of the second metatarsal head plantarward, and to a lesser extent the third metatarsal head plantarward. I cannot elicit a classic burning tingling and searing pain in her second and third interdigital webspace, but she does complain of some forefoot pain when she ambulates. I am concerned about also the integrity of her plantar plate, OBICI forefoot #2 toe. Recommendations, is for MRI of her right forefoot, this has prognostic value, this Assess the plantar plate #2 toe, and to assess any abnormal signal in the second interdigital webspace MTP right space that might be consistent with any type of interdigital neuritis. Conservative care at this point, has consisted of custom orthotics, oversized extra-depth shoes, activity modification, anti-inflammatory agents, she is still having disabling pain in her right forefoot, second metatarsal head plantarward primarily. She has had conservative care, for over 12 months now. Upon next visit, she will require some weightbearing x-rays, AP, lateral oblique x-rays. Potential surgical intervention, for her, may consist of shortening Weil osteotomies of the second and third metatarsals, with exploration of the second interdigital webspace. DIAGNOSES    1. Primary osteoarthritis of right foot    2. Metatarsalgia of right foot    3. Right foot pain    4. History of clubfoot    5.  Neuroma of foot        Orders Placed This Encounter    AMB POC XRAY, FOOT; COMPLETE, 3+ VIEW     ASK ALL FEMALE PATIENTS IF PREGNANT     Order Specific Question:   Reason for Exam     Answer:   PAIN    MRI FOOT RT WO CONT     Standing Status:   Future     Standing Expiration Date:   9/9/2023     Order Specific Question:   Region of foot     Answer: Fore     Order Specific Question:   Reason for Exam     Answer:   examine 2nd metatarsal head, adjacent plantar plate and nerves; r/o plantar plate tear, neuroma    cyclobenzaprine (FLEXERIL) 10 mg tablet     Sig: Take 10 mg by mouth two (2) times a day. HYDROcodone-acetaminophen (NORCO) 5-325 mg per tablet     Sig: take 1 tablet by mouth four times a day for chronic pain          PLAN:    1. I obtained right foot 3V XR in the office today. 2. I will order a right foot MRI to examine 2nd metatarsal head, adjacent plantar plate and nerves; r/o plantar plate tear, neuroma    RTO after MRI    There are no Patient Instructions on file for this visit. Please follow up with your PCP for any health maintenance as recommended         Ana Vázquez  expresses understanding of the diagnosis, treatment plan, and all of their proposed questions were answered to their satisfaction. Patient education has been provided re the diagnoses. [facility] //  [address] [place] [address] IS A 45 y.o. female who is a/an  established patient, presenting to my outpatient office for evaluation of  the following chief complaint(s):     Chief Complaint   Patient presents with    Foot Pain     Rt foot      At LOV, Ana Vázquze presented with metatarsalgia and neuritis of the right foot. I ordered a right custom trilaminar orthotic with goal to offload the forefoot. Since LOVAna continues to endorse pain in the right foot. She complains of pain in the right plantar medial forefoot with weightbearing and describes a feeling of a \"marble\" under the 2nd metatarsal head. She notes relief with special slippers or her custom trilaminar orthotic. She also expresses interest in surgical intervention.  She notes that she overtrimmed her left 1st toenail lately with some bleeding but no purulence or drainage. She also mentions a history of a left clubfoot. Visit Vitals  /84 (BP 1 Location: Left arm, BP Patient Position: Sitting, BP Cuff Size: Adult)   Temp 96.9 °F (36.1 °C) (Temporal)   Ht 5' 3\" (1.6 m)   Wt 177 lb 3.2 oz (80.4 kg)   BMI 31.39 kg/m²       Appearance: Alert, well appearing and pleasant patient who is in no distress, oriented to person, place/time, and who follows commands. Normal dress/motor activity/thought processes/memory. This patient is accompanied in the examination room by her  self. Patient arrives to office via: with assistive device: right custom trilaminar orthotic. Psychiatric:  Normal Affect/mood. Judgement, behavior, and conduct are appropriate. Speech normal in context and clarity, memory intact grossly, no involuntary movements - tremors. H EENT (2): Head normocephalic & atraumatic. Eye: pupils are round// EOM are intact // Neck: ROM WNL  // Hearings Intact   Respiratory: Breathing non labored     ANKLE/FOOT right    Gait: uses assistive device - right custom trilaminar orthotic  Tenderness: exquisite over the 2nd metatarsal head; mild over the 3rd metatarsal head. She is nontender to stress her midfoot level and her hindfoot fixed. Cutaneous: Redness over the left foot plantar 1st MTP joint at the skin nail interface, where she had trimmed her nails. There is no pus, no erythema, there is redness of the skin nail plate interface. Joint Motion: WNL right ankle, right forefoot. Joint / Tendon Stability:  No Ankle or Subtalar instability or joint laxity. No peroneal sublux ability or dislocation  Alignment: Right: neutral Hindfoot. Hallux valgus; increased space between 2nd and 3rd toes. Left: varus alignment of left 2nd and 3rd toes. Neuro Motor/Sensory: NL/NL  Vascular: NL foot/ankle pulses,   Lymphatics: No extremity lymphedema, No calf swelling, no tenderness to calf muscles. CHART REVIEW     Jin Renner has been experiencing pain and discomfort confirmed as outlined in the pain assessment outlined below.  was reviewed by Ta Angulo MD, d on 2022. Pain Assessment  2022   Location of Pain Foot   Location Modifiers Right   Severity of Pain 5   Quality of Pain Aching; Sharp   Quality of Pain Comment -   Duration of Pain Persistent   Frequency of Pain Constant   Date Pain First Started (No Data)   Date Pain First Started Comment f.u   Aggravating Factors Bending;Walking;Standing;Squatting;Stairs; Exercise   Aggravating Factors Comment -   Limiting Behavior Yes   Relieving Factors Nothing   Relieving Factors Comment -   Result of Injury No   Work-Related Injury -        Jin Renner  has a past medical history of Anemia, GERD (gastroesophageal reflux disease), Right foot pain, and Submucous leiomyoma of uterus. Patients is employed at:          has a past medical history of Anemia, GERD (gastroesophageal reflux disease), Right foot pain, and Submucous leiomyoma of uterus. has a past surgical history that includes hx wisdom teeth extraction; hx knee arthroscopy (Right); and hx  section (). family history includes Cancer in her mother; Diabetes in her father. Current Outpatient Medications   Medication Sig    cyclobenzaprine (FLEXERIL) 10 mg tablet Take 10 mg by mouth two (2) times a day. HYDROcodone-acetaminophen (NORCO) 5-325 mg per tablet take 1 tablet by mouth four times a day for chronic pain    fexofenadine (ALLEGRA) 180 mg tablet Take  by mouth. omega-3 acid ethyl esters (LOVAZA) 1 gram capsule omega-3 acid ethyl esters 1 gram capsule    biotin 10,000 mcg TbDi biotin 10,000 mcg disintegrating tablet   Take by oral route. ascorbic acid, vitamin C, (VITAMIN C) 500 mg tablet Vitamin C 500 mg tablet   Take by oral route. black cohosh 540 mg cap black cohosh 540 mg capsule   Take by oral route.     cholecalciferol (VITAMIN D3) 25 mcg (1,000 unit) cap Take  by mouth daily. naproxen sodium 220 mg cap Take  by mouth as needed. ferrous sulfate 325 mg (65 mg iron) cpER Take 3 Tablets by mouth two (2) times a day. zinc 50 mg tab tablet Take  by mouth daily. Cyanocobalamin-Cobamamide 5,000-100 mcg lozg by SubLINGual route two (2) times a day. meloxicam (Mobic) 15 mg tablet Take 1 Tablet by mouth daily (with breakfast). (Patient not taking: No sig reported)    celecoxib (CeleBREX) 200 mg capsule Take 1 Capsule by mouth two (2) times a day. (Patient not taking: No sig reported)    omeprazole (PRILOSEC OTC) 20 mg tablet Take 20 mg by mouth daily. (Patient not taking: No sig reported)    loratadine (CLARITIN) 10 mg tablet Take 10 mg by mouth daily. (Patient not taking: No sig reported)     No current facility-administered medications for this visit. No Known Allergies  Social History     Occupational History    Not on file   Tobacco Use    Smoking status: Never    Smokeless tobacco: Never   Vaping Use    Vaping Use: Never used   Substance and Sexual Activity    Alcohol use: Never    Drug use: Never    Sexual activity: Yes     Birth control/protection: None       reports that she has never smoked. She has never used smokeless tobacco. She reports that she does not drink alcohol and does not use drugs. DIAGNOSTIC LAB DATA      Lab Results   Component Value Date/Time    Hemoglobin A1c 4.8 05/25/2022 09:07 AM    Hemoglobin A1c 5.1 07/22/2021 09:24 AM    Hemoglobin A1c 5.1 07/08/2020 12:00 PM    //   Lab Results   Component Value Date/Time    Glucose 95 05/25/2022 09:07 AM        No results found for: [id number], [id number]      Lab Results   Component Value Date/Time    Vitamin D, 25-OH, Total 56.1 05/25/2022 09:07 AM        Drug Screen Most Recent Result Date    No resulted procedures found. REVIEW OF SYSTEMS : 8/9/2022  ALL BELOW ARE Negative except : SEE HPI     All other systems reviewed and are negative. 12 point review of systems otherwise negative unless noted in HPI. RADIOGRAPHS// IMAGING//DIAGNOSTIC DATA     Orders Placed This Encounter    AMB POC XRAY, FOOT; COMPLETE, 3+ VIEW    MRI FOOT RT WO CONT    cyclobenzaprine (FLEXERIL) 10 mg tablet    HYDROcodone-acetaminophen (NORCO) 5-325 mg per tablet              Right foot nonweightbearing 3 views x-rays, NON WEIGHT BEARING, 3 views, AP/LAT/OBL completed 8/9/2022 AT [facility]    X-rays reveal   Osseous: no acute fracture//there are no dislocation or subluxation noted. Overall alignment is suggest some slight varus alignment of the right #2 toe acceptable. Soft tissue swelling is not noted. No osteolytic or osteoblastic lesions noted  Mineralization suggests osteopenia. Degenerative changes are  there is a severe degenerative changes across the first and second third TMT joint reticulations  noted. Calcified vessels are not present. I have personally reviewed the images of the above study. The interpretation of this study is my professional opinion           I have spent 30 minutes reviewing the previous notes, reviewing diagnostic studies [Advanced  Imaging, Diagnostic test results (x-rays)] and had a direct face to face with the patient discussing the diagnosis and importance of compliance with the treatment and plan. There is  discussion for the potential for surgery, answering all questions, as well as documenting patient care coordination for this individual on the day of the visit. Disclaimer:     Sections of this note are dictated using utilizing voice recognition software, which may have resulted in some phonetic based errors in grammar and contents. Even though attempts were made to correct all the mistakes, some may have been missed, and remained in the body of the document. If questions arise, please contact our department. An electronic signature was used to authenticate this note.     Lyndon Stationjacob Beauchampde may have a reminder for a \"due or due soon\" health maintenance. I have asked that she contact her primary care provider for follow-up on this health maintenance. There are no Patient Instructions on file for this visit. Please follow up with your PCP for any health maintenance as recommended.

## 2022-08-09 NOTE — PROGRESS NOTES
AMBULATORY PROGRESS NOTE      Patient: Neyda Richardson             MRN: [id number]     SSN: [id number] Body mass index is 31.39 kg/m². YOB: 1970     AGE: 45 y.o. EX: female    PCP: Edgar Castillo MD       IMPRESSION //  DIAGNOSIS AND TREATMENT PLAN      Neyda Richardson has a diagnosis of:        She clinically has tenderness of the second metatarsal head plantarward, and to a lesser extent the third metatarsal head plantarward. I cannot elicit a classic burning tingling and searing pain in her second and third interdigital webspace, but she does complain of some forefoot pain when she ambulates. I am concerned about also the integrity of her plantar plate, OBICI forefoot #2 toe. Recommendations, is for MRI of her right forefoot, this has prognostic value, this Assess the plantar plate #2 toe, and to assess any abnormal signal in the second interdigital webspace MTP right space that might be consistent with any type of interdigital neuritis. Conservative care at this point, has consisted of custom orthotics, oversized extra-depth shoes, activity modification, anti-inflammatory agents, she is still having disabling pain in her right forefoot, second metatarsal head plantarward primarily. She has had conservative care, for over 12 months now. Upon next visit, she will require some weightbearing x-rays, AP, lateral oblique x-rays. Potential surgical intervention, for her, may consist of shortening Weil osteotomies of the second and third metatarsals, with exploration of the second interdigital webspace. DIAGNOSES    1. Primary osteoarthritis of right foot    2. Metatarsalgia of right foot    3. Right foot pain    4. History of clubfoot    5.  Neuroma of foot        Orders Placed This Encounter    AMB POC XRAY, FOOT; COMPLETE, 3+ VIEW     ASK ALL FEMALE PATIENTS IF PREGNANT     Order Specific Question:   Reason for Exam     Answer:   PAIN    MRI FOOT RT WO CONT     Standing Status:   Future     Standing Expiration Date:   9/9/2023     Order Specific Question:   Region of foot     Answer: Fore     Order Specific Question:   Reason for Exam     Answer:   examine 2nd metatarsal head, adjacent plantar plate and nerves; r/o plantar plate tear, neuroma    cyclobenzaprine (FLEXERIL) 10 mg tablet     Sig: Take 10 mg by mouth two (2) times a day. HYDROcodone-acetaminophen (NORCO) 5-325 mg per tablet     Sig: take 1 tablet by mouth four times a day for chronic pain          PLAN:    1. I obtained right foot 3V XR in the office today. 2. I will order a right foot MRI to examine 2nd metatarsal head, adjacent plantar plate and nerves; r/o plantar plate tear, neuroma    RTO after MRI    There are no Patient Instructions on file for this visit. Please follow up with your PCP for any health maintenance as recommended         Tk Gallardo  expresses understanding of the diagnosis, treatment plan, and all of their proposed questions were answered to their satisfaction. Patient education has been provided re the diagnoses. [facility] //  [address] [place] [address] Luis IS A 45 y.o. female who is a/an  established patient, presenting to my outpatient office for evaluation of  the following chief complaint(s):     Chief Complaint   Patient presents with    Foot Pain     Rt foot      At LOV, Tk Gallardo presented with metatarsalgia and neuritis of the right foot. I ordered a right custom trilaminar orthotic with goal to offload the forefoot. Since LOV, Tk Gallardo continues to endorse pain in the right foot. She complains of pain in the right plantar medial forefoot with weightbearing and describes a feeling of a \"marble\" under the 2nd metatarsal head. She notes relief with special slippers or her custom trilaminar orthotic. She also expresses interest in surgical intervention.  She notes that she overtrimmed her left 1st toenail lately with some bleeding but no purulence or drainage. She also mentions a history of a left clubfoot. Visit Vitals  /84 (BP 1 Location: Left arm, BP Patient Position: Sitting, BP Cuff Size: Adult)   Temp 96.9 °F (36.1 °C) (Temporal)   Ht 5' 3\" (1.6 m)   Wt 177 lb 3.2 oz (80.4 kg)   BMI 31.39 kg/m²       Appearance: Alert, well appearing and pleasant patient who is in no distress, oriented to person, place/time, and who follows commands. Normal dress/motor activity/thought processes/memory. This patient is accompanied in the examination room by her  self. Patient arrives to office via: with assistive device: right custom trilaminar orthotic. Psychiatric:  Normal Affect/mood. Judgement, behavior, and conduct are appropriate. Speech normal in context and clarity, memory intact grossly, no involuntary movements - tremors. H EENT (2): Head normocephalic & atraumatic. Eye: pupils are round// EOM are intact // Neck: ROM WNL  // Hearings Intact   Respiratory: Breathing non labored     ANKLE/FOOT right    Gait: uses assistive device - right custom trilaminar orthotic  Tenderness:  exquisite  over the 2nd metatarsal head; mild over the 3rd metatarsal head. She is nontender to stress her midfoot level and her hindfoot fixed. Cutaneous: Redness over the left foot plantar 1st MTP joint at the skin nail interface, where she had trimmed her nails. There is no pus, no erythema, there is redness of the skin nail plate interface. Joint Motion: WNL right ankle, right forefoot. Joint / Tendon Stability:  No Ankle or Subtalar instability or joint laxity. No peroneal sublux ability or dislocation  Alignment: Right: neutral Hindfoot. Hallux valgus; increased space between 2nd and 3rd toes. Left: varus alignment of left 2nd and 3rd toes. Neuro Motor/Sensory: NL/NL  Vascular: NL foot/ankle pulses,   Lymphatics: No extremity lymphedema, No calf swelling, no tenderness to calf muscles. CHART REVIEW     Abbe Barbosa has been experiencing pain and discomfort confirmed as outlined in the pain assessment outlined below.  was reviewed by Iman Martinez MD, d on 2022. Pain Assessment  2022   Location of Pain Foot   Location Modifiers Right   Severity of Pain 5   Quality of Pain Aching; Sharp   Quality of Pain Comment -   Duration of Pain Persistent   Frequency of Pain Constant   Date Pain First Started (No Data)   Date Pain First Started Comment f.u   Aggravating Factors Bending;Walking;Standing;Squatting;Stairs; Exercise   Aggravating Factors Comment -   Limiting Behavior Yes   Relieving Factors Nothing   Relieving Factors Comment -   Result of Injury No   Work-Related Injury -        Abbe Barbosa  has a past medical history of Anemia, GERD (gastroesophageal reflux disease), Right foot pain, and Submucous leiomyoma of uterus. Patients is employed at:          has a past medical history of Anemia, GERD (gastroesophageal reflux disease), Right foot pain, and Submucous leiomyoma of uterus. has a past surgical history that includes hx wisdom teeth extraction; hx knee arthroscopy (Right); and hx  section (). family history includes Cancer in her mother; Diabetes in her father. Current Outpatient Medications   Medication Sig    cyclobenzaprine (FLEXERIL) 10 mg tablet Take 10 mg by mouth two (2) times a day. HYDROcodone-acetaminophen (NORCO) 5-325 mg per tablet take 1 tablet by mouth four times a day for chronic pain    fexofenadine (ALLEGRA) 180 mg tablet Take  by mouth. omega-3 acid ethyl esters (LOVAZA) 1 gram capsule omega-3 acid ethyl esters 1 gram capsule    biotin 10,000 mcg TbDi biotin 10,000 mcg disintegrating tablet   Take by oral route. ascorbic acid, vitamin C, (VITAMIN C) 500 mg tablet Vitamin C 500 mg tablet   Take by oral route. black cohosh 540 mg cap black cohosh 540 mg capsule   Take by oral route.     cholecalciferol (VITAMIN D3) 25 mcg (1,000 unit) cap Take  by mouth daily. naproxen sodium 220 mg cap Take  by mouth as needed. ferrous sulfate 325 mg (65 mg iron) cpER Take 3 Tablets by mouth two (2) times a day. zinc 50 mg tab tablet Take  by mouth daily. Cyanocobalamin-Cobamamide 5,000-100 mcg lozg by SubLINGual route two (2) times a day. meloxicam (Mobic) 15 mg tablet Take 1 Tablet by mouth daily (with breakfast). (Patient not taking: No sig reported)    celecoxib (CeleBREX) 200 mg capsule Take 1 Capsule by mouth two (2) times a day. (Patient not taking: No sig reported)    omeprazole (PRILOSEC OTC) 20 mg tablet Take 20 mg by mouth daily. (Patient not taking: No sig reported)    loratadine (CLARITIN) 10 mg tablet Take 10 mg by mouth daily. (Patient not taking: No sig reported)     No current facility-administered medications for this visit. No Known Allergies  Social History     Occupational History    Not on file   Tobacco Use    Smoking status: Never    Smokeless tobacco: Never   Vaping Use    Vaping Use: Never used   Substance and Sexual Activity    Alcohol use: Never    Drug use: Never    Sexual activity: Yes     Birth control/protection: None       reports that she has never smoked. She has never used smokeless tobacco. She reports that she does not drink alcohol and does not use drugs. DIAGNOSTIC LAB DATA      Lab Results   Component Value Date/Time    Hemoglobin A1c 4.8 05/25/2022 09:07 AM    Hemoglobin A1c 5.1 07/22/2021 09:24 AM    Hemoglobin A1c 5.1 07/08/2020 12:00 PM    //   Lab Results   Component Value Date/Time    Glucose 95 05/25/2022 09:07 AM        No results found for: [id number], [id number]      Lab Results   Component Value Date/Time    Vitamin D, 25-OH, Total 56.1 05/25/2022 09:07 AM        Drug Screen Most Recent Result Date    No resulted procedures found. REVIEW OF SYSTEMS : 8/9/2022  ALL BELOW ARE Negative except : SEE HPI     All other systems reviewed and are negative. 12 point review of systems otherwise negative unless noted in HPI. RADIOGRAPHS// IMAGING//DIAGNOSTIC DATA     Orders Placed This Encounter    AMB POC XRAY, FOOT; COMPLETE, 3+ VIEW    MRI FOOT RT WO CONT    cyclobenzaprine (FLEXERIL) 10 mg tablet    HYDROcodone-acetaminophen (NORCO) 5-325 mg per tablet              Right foot nonweightbearing 3 views x-rays, NON WEIGHT BEARING, 3 views, AP/LAT/OBL completed 8/9/2022 AT [facility]    X-rays reveal   Osseous: no acute fracture//there are no dislocation or subluxation noted. Overall alignment is suggest some slight varus alignment of the right #2 toe acceptable. Soft tissue swelling is not noted. No osteolytic or osteoblastic lesions noted  Mineralization suggests osteopenia. Degenerative changes are  there is a severe degenerative changes across the first and second third TMT joint reticulations  noted. Calcified vessels are not present. I have personally reviewed the images of the above study. The interpretation of this study is my professional opinion           I have spent 30 minutes reviewing the previous notes, reviewing diagnostic studies [Advanced  Imaging, Diagnostic test results (x-rays)] and had a direct face to face with the patient discussing the diagnosis and importance of compliance with the treatment and plan. There is  discussion for the potential for surgery, answering all questions, as well as documenting patient care coordination for this individual on the day of the visit. Disclaimer:     Sections of this note are dictated using utilizing voice recognition software, which may have resulted in some phonetic based errors in grammar and contents. Even though attempts were made to correct all the mistakes, some may have been missed, and remained in the body of the document. If questions arise, please contact our department. An electronic signature was used to authenticate this note.     Sen Huang may have a reminder for a \"due or due soon\" health maintenance. I have asked that she contact her primary care provider for follow-up on this health maintenance. There are no Patient Instructions on file for this visit. Please follow up with your PCP for any health maintenance as recommended.                 Scribed by Queenie Ruby, as dictated by Sean Velarde MD.   8/9/2022  7:41 AM

## 2022-08-28 ENCOUNTER — HOSPITAL ENCOUNTER (OUTPATIENT)
Age: 52
Discharge: HOME | End: 2022-08-28
Attending: ORTHOPAEDIC SURGERY
Payer: COMMERCIAL

## 2022-08-28 DIAGNOSIS — M77.41 METATARSALGIA OF RIGHT FOOT: ICD-10-CM

## 2022-08-28 DIAGNOSIS — M19.071 PRIMARY OSTEOARTHRITIS OF RIGHT FOOT: ICD-10-CM

## 2022-08-28 DIAGNOSIS — D36.13 NEUROMA OF FOOT: ICD-10-CM

## 2022-08-28 DIAGNOSIS — Z87.768 HISTORY OF CLUBFOOT: ICD-10-CM

## 2022-08-28 PROCEDURE — 73718 MRI LOWER EXTREMITY W/O DYE: CPT
